# Patient Record
Sex: FEMALE | Race: BLACK OR AFRICAN AMERICAN | NOT HISPANIC OR LATINO | Employment: FULL TIME | URBAN - METROPOLITAN AREA
[De-identification: names, ages, dates, MRNs, and addresses within clinical notes are randomized per-mention and may not be internally consistent; named-entity substitution may affect disease eponyms.]

---

## 2023-11-28 ENCOUNTER — OFFICE VISIT (OUTPATIENT)
Dept: OBSTETRICS AND GYNECOLOGY | Facility: CLINIC | Age: 33
End: 2023-11-28
Payer: COMMERCIAL

## 2023-11-28 VITALS
HEIGHT: 68 IN | DIASTOLIC BLOOD PRESSURE: 76 MMHG | SYSTOLIC BLOOD PRESSURE: 110 MMHG | WEIGHT: 164 LBS | BODY MASS INDEX: 24.86 KG/M2

## 2023-11-28 DIAGNOSIS — B37.9 YEAST INFECTION: ICD-10-CM

## 2023-11-28 DIAGNOSIS — Z76.0 ENCOUNTER FOR MEDICATION REFILL: ICD-10-CM

## 2023-11-28 DIAGNOSIS — L70.8 OTHER ACNE: ICD-10-CM

## 2023-11-28 DIAGNOSIS — L70.8 OTHER ACNE: Primary | ICD-10-CM

## 2023-11-28 DIAGNOSIS — N39.0 FREQUENT UTI: ICD-10-CM

## 2023-11-28 PROCEDURE — 99212 OFFICE O/P EST SF 10 MIN: CPT | Performed by: OBSTETRICS & GYNECOLOGY

## 2023-11-28 PROCEDURE — 1036F TOBACCO NON-USER: CPT | Performed by: OBSTETRICS & GYNECOLOGY

## 2023-11-28 RX ORDER — SPIRONOLACTONE 50 MG/1
1 TABLET, FILM COATED ORAL DAILY
COMMUNITY
Start: 2022-09-23 | End: 2023-11-28 | Stop reason: SDUPTHER

## 2023-11-28 RX ORDER — FLUCONAZOLE 150 MG/1
150 TABLET ORAL ONCE
Qty: 2 TABLET | Refills: 1 | Status: SHIPPED | OUTPATIENT
Start: 2023-11-28 | End: 2023-11-28

## 2023-11-28 RX ORDER — SPIRONOLACTONE 50 MG/1
50 TABLET, FILM COATED ORAL DAILY
Qty: 90 TABLET | Refills: 3 | Status: SHIPPED | OUTPATIENT
Start: 2023-11-28 | End: 2024-03-25 | Stop reason: SDUPTHER

## 2023-11-28 RX ORDER — SPIRONOLACTONE 50 MG/1
50 TABLET, FILM COATED ORAL DAILY
Qty: 30 TABLET | Refills: 11 | Status: SHIPPED | OUTPATIENT
Start: 2023-11-28 | End: 2023-11-28

## 2023-11-28 NOTE — TELEPHONE ENCOUNTER
Pharmacy and patient requesting 90 day supply. Can you please send in an updated RX. Patient was seen today.

## 2023-11-28 NOTE — PROGRESS NOTES
Subjective   Patient ID: Obed Villareal is a 33 y.o. female who presents for No chief complaint on file..  HPI she is a 33-year-old G2, P1 with copper IUD presents for a refill of her spironolactone.  She states that her acne has been much better since she started the spironolactone.  She also has noticed that she gets more UTIs and yeast infection ever since her Her IUD a year and half ago.  She says that she is engage is getting  and does not want to remove the IUD at this time.  However she will consider in 6 months if she continues to have problems with UTIs and yeast infection to have the IUD removed.  She will try to get pregnant after she is .  Her cycles are regular but heavier and more crampy since the IUD.    Review of Systems   Genitourinary:         Frequent UTIs, and yeast infection since IUD.   All other systems reviewed and are negative.      Objective   Physical Exam  Constitutional:       Appearance: Normal appearance. She is normal weight.   HENT:      Head: Normocephalic and atraumatic.   Pulmonary:      Effort: Pulmonary effort is normal.   Neurological:      Mental Status: She is alert.   Psychiatric:         Mood and Affect: Mood normal.         Behavior: Behavior normal.         Assessment/Plan   Problem List Items Addressed This Visit             ICD-10-CM    Other acne - Primary L70.8    Relevant Medications    spironolactone (Aldactone) 50 mg tablet    Yeast infection B37.9    Relevant Medications    fluconazole (Diflucan) 150 mg tablet    Frequent UTI N39.0    Encounter for medication refill Z76.0   She is going to try probiotic and change her diet to reduce UTIs and yeast infections.  We did discuss other alternative to cover IUD for contraception in the future.  A refill of her spironolactone and Diflucan was sent to her pharmacy.  She will be due for annual exam in 6 months and will decide whether remove the copper IUD at that time.

## 2024-03-25 DIAGNOSIS — L70.8 OTHER ACNE: ICD-10-CM

## 2024-03-26 RX ORDER — SPIRONOLACTONE 50 MG/1
50 TABLET, FILM COATED ORAL DAILY
Qty: 90 TABLET | Refills: 3 | Status: SHIPPED | OUTPATIENT
Start: 2024-03-26

## 2024-09-12 ENCOUNTER — APPOINTMENT (OUTPATIENT)
Dept: OBSTETRICS AND GYNECOLOGY | Facility: CLINIC | Age: 34
End: 2024-09-12
Payer: COMMERCIAL

## 2024-09-12 VITALS — HEIGHT: 68 IN | WEIGHT: 173 LBS | BODY MASS INDEX: 26.22 KG/M2

## 2024-09-12 DIAGNOSIS — N89.8 VAGINAL DISCHARGE: Primary | ICD-10-CM

## 2024-09-12 DIAGNOSIS — Z11.3 SCREEN FOR STD (SEXUALLY TRANSMITTED DISEASE): ICD-10-CM

## 2024-09-12 PROCEDURE — 87205 SMEAR GRAM STAIN: CPT

## 2024-09-12 PROCEDURE — 1036F TOBACCO NON-USER: CPT | Performed by: NURSE PRACTITIONER

## 2024-09-12 PROCEDURE — 87661 TRICHOMONAS VAGINALIS AMPLIF: CPT

## 2024-09-12 PROCEDURE — 87591 N.GONORRHOEAE DNA AMP PROB: CPT

## 2024-09-12 PROCEDURE — 87491 CHLMYD TRACH DNA AMP PROBE: CPT

## 2024-09-12 PROCEDURE — 99214 OFFICE O/P EST MOD 30 MIN: CPT | Performed by: NURSE PRACTITIONER

## 2024-09-12 PROCEDURE — 3008F BODY MASS INDEX DOCD: CPT | Performed by: NURSE PRACTITIONER

## 2024-09-12 RX ORDER — METRONIDAZOLE 500 MG/1
500 TABLET ORAL 2 TIMES DAILY
Qty: 14 TABLET | Refills: 0 | Status: SHIPPED | OUTPATIENT
Start: 2024-09-12 | End: 2024-09-19

## 2024-09-12 ASSESSMENT — ENCOUNTER SYMPTOMS
CONSTITUTIONAL NEGATIVE: 1
PSYCHIATRIC NEGATIVE: 1
CARDIOVASCULAR NEGATIVE: 1
RESPIRATORY NEGATIVE: 1

## 2024-09-13 LAB
C TRACH RRNA SPEC QL NAA+PROBE: NEGATIVE
N GONORRHOEA DNA SPEC QL PROBE+SIG AMP: NEGATIVE
T VAGINALIS RRNA SPEC QL NAA+PROBE: NEGATIVE

## 2024-09-14 LAB
CLUE CELLS VAG LPF-#/AREA: NORMAL /[LPF]
NUGENT SCORE: 0
YEAST VAG WET PREP-#/AREA: NORMAL

## 2024-11-14 NOTE — PROGRESS NOTES
Mask Type (Optional): biocellulose masque Subjective   Patient ID: Obed Villareal is a 34 y.o. female who presents for Infection (Patient complains of slight vaginal itching. No other symptoms).  34 year old here for complaints of having vaginal discharge.  She notes that she had symptoms before her period on 8/29.  She then had her period and took flagyl pills.  Her discharge returned.  She notes she took 2grams of flagyl once.  She denies any new partners but desires std testing.  She denies any abnormal bleeding, pain and urinary changes.      Vaginal Discharge  The patient's primary symptoms include genital itching and vaginal discharge. This is a recurrent problem. The current episode started 1 to 4 weeks ago. The problem occurs constantly. The problem has been unchanged. The patient is experiencing no pain. She is not pregnant. The vaginal discharge was thin and white. There has been no bleeding. Nothing aggravates the symptoms. She has tried antibiotics for the symptoms. The treatment provided no relief. No, her partner does not have an STD.       Review of Systems   Constitutional: Negative.    Respiratory: Negative.     Cardiovascular: Negative.    Genitourinary:  Positive for vaginal discharge.   Psychiatric/Behavioral: Negative.         Objective   Physical Exam  Vitals reviewed.   Constitutional:       Appearance: Normal appearance. She is well-developed.   Pulmonary:      Effort: Pulmonary effort is normal. No respiratory distress.   Chest:   Breasts:     Breasts are symmetrical.      Right: Normal. No swelling, bleeding, inverted nipple, mass, nipple discharge, skin change or tenderness.      Left: Normal. No swelling, bleeding, inverted nipple, mass, nipple discharge, skin change or tenderness.   Abdominal:      Palpations: Abdomen is soft.   Genitourinary:     General: Normal vulva.      Exam position: Lithotomy position.      Pubic Area: No rash.       Labia:         Right: No rash, tenderness, lesion or injury.         Left: No rash,  Treatment Serum Override: lipid tenderness, lesion or injury.       Urethra: No prolapse, urethral pain, urethral swelling or urethral lesion.      Vagina: Vaginal discharge present.      Cervix: Normal.      Uterus: Normal.       Adnexa: Right adnexa normal and left adnexa normal.      Rectum: Normal.   Musculoskeletal:         General: Normal range of motion.   Lymphadenopathy:      Upper Body:      Right upper body: No supraclavicular, axillary or pectoral adenopathy.      Left upper body: No supraclavicular, axillary or pectoral adenopathy.   Skin:     General: Skin is warm and dry.   Neurological:      General: No focal deficit present.      Mental Status: She is alert and oriented to person, place, and time. Mental status is at baseline.   Psychiatric:         Attention and Perception: Attention and perception normal.         Mood and Affect: Mood and affect normal.         Speech: Speech normal.         Behavior: Behavior normal. Behavior is cooperative.         Thought Content: Thought content normal.         Judgment: Judgment normal.         Assessment/Plan   Problem List Items Addressed This Visit             ICD-10-CM    Vaginal discharge - Primary N89.8    Relevant Medications    metroNIDAZOLE (Flagyl) 500 mg tablet    Other Relevant Orders    Neisseria gonorrhoeae, Amplified    Chlamydia Trachomatis, Amplified    Trichomonas vaginalis, Nucleic Acid Detection    Vaginitis Gram Stain For Bacterial Vaginosis + Yeast     Other Visit Diagnoses         Codes    Screen for STD (sexually transmitted disease)     Z11.3    Relevant Orders    Neisseria gonorrhoeae, Amplified    Chlamydia Trachomatis, Amplified    Trichomonas vaginalis, Nucleic Acid Detection        Vaginal culture sent  Gc/chlamydia/trich sent  Flagyl ordered, will treat further if needed pending results  Follow up as needed         MERCY Reyes 09/12/24 4:23 PM    Cryo Stick Massage (Optional): yes Treatment Type (Optional): Rescue Facial Detail Level: Zone Led Light (Optional): red Extraction Method: sterile needle High Frequency Facial (Optional): neon (orange)

## 2025-05-24 DIAGNOSIS — L70.8 OTHER ACNE: ICD-10-CM

## 2025-05-29 RX ORDER — SPIRONOLACTONE 50 MG/1
50 TABLET, FILM COATED ORAL DAILY
Qty: 90 TABLET | Refills: 3 | Status: SHIPPED | OUTPATIENT
Start: 2025-05-29

## 2025-05-29 NOTE — TELEPHONE ENCOUNTER
Automatic electronic refill request from pharmacy.  Patient last seen: 9/12/24 by KS  Upcoming appointment scheduled: NONE  Refills pended for Dr. August to review.